# Patient Record
(demographics unavailable — no encounter records)

---

## 2024-11-08 NOTE — PHYSICAL EXAM
[Fully active, able to carry on all pre-disease performance without restriction] : Status 0 - Fully active, able to carry on all pre-disease performance without restriction [Normal] : affect appropriate [de-identified] : B mastectomy with implant reconstruction; ecchymosis over the anterior chest wall; no abnl LN palpable

## 2024-11-08 NOTE — HISTORY OF PRESENT ILLNESS
[Disease: _____________________] : Disease: [unfilled] [T: ___] : T[unfilled] [N: ___] : N[unfilled] [AJCC Stage: ____] : AJCC Stage: [unfilled] [de-identified] : Age 55: bilateral breast cancer Screen detected: she had interval breast mammogram/ sonogram which showed L breast findings with additional imaging showing 9:00: 2 cm FN multilobulated nodule versus 2 adjacent nodules: the anterior 0.5 x 0.4 x 0.5 cm and the posterior 1.1 x 0.6 cm which may be benign. She had left breast u/s guided biopsy 9:00 which showed invasive ductal carcinoma well differentiated with associated calcifications: Grade 1, ER %, [place] 71-80%, Her2 negative (1) and atypical ductal hyperplasia. She had MRI of the breast done on 1/3/2024 which showed right breast in the central inner breast clumped segmental non mass enhancement spanning 3.4 cm in AP diameter, upper outer breast middle depth 0.3 cm focus of enhancement with washout kinetics. Left breast showed biopsy proven carcinoma at 9:00 measuring 1.3 x 1.8 x 1.5 cm; non mass enhancement extends 0.9 cm anterolateral from the mass. Biopsy of the R breast upper outer quadrant of the MRI enhancement showed DCIS. She sought different surgical opinions. On 2/14/2024, she underwent bilateral mastectomy with SLNB with Dr Fowler at [facility]. She had concomitant expander reconstruction. The left breast pathology showed left breast invasive ductal carcinoma measuring 4 mm, Grade 1, and negative SLN (0/6): ER >90%, AL <1%, Her2 (2). The right breast pathology showed 2 foci of microinvasive disease </ = 1mm, DCIS measuring 3 cm, and negative SLN (0/5): Her2 was 3+: positive. She was started on anastrozole 3/2024. Stopped medication due to worsening stiffness and joint pain causing instability with walking. Trial of letrozole 7/2024: arthralgias were different: made fingers freeze and also affecting legs after 1 month. Trial of exemestane:  [de-identified] : anastrozole 3/2024 to 6/2024  letrozole 7/2024 to 9/2024 exemestane 11/2024 to present  [de-identified] : L: invasive ductal carcinoma well differentiated ER %, OK 71-80%, Her2 negative R microinvasive ductal carcinoma Her2 positive  [de-identified] : Since last evaluation, she feels the arthralgias have improved. She is taking the omega 3 supplement. Occasionally has trigger finger but much improved. She had 2nd reconstruction revision and has bruising. Denies any other health changes.

## 2024-11-08 NOTE — REVIEW OF SYSTEMS
[Diarrhea: Grade 0] : Diarrhea: Grade 0 [Joint Pain] : no joint pain [Joint Stiffness] : joint stiffness [Muscle Pain] : no muscle pain [Muscle Weakness] : no muscle weakness [Skin Rash] : no skin rash [Skin Wound] : no skin wound [Easy Bleeding] : no tendency for easy bleeding [Easy Bruising] : a tendency for easy bruising [Swollen Glands] : no swollen glands [Negative] : Allergic/Immunologic [de-identified] : bruising after her 2nd reconstruction

## 2024-11-08 NOTE — BEGINNING OF VISIT
[0] : 2) Feeling down, depressed, or hopeless: Not at all (0) [PHQ-2 Negative] : PHQ-2 Negative [Pain Scale: ___] : On a scale of 1-10, today the patient's pain is a(n) [unfilled]. [Future Reassessment of Pain Scale] : Future reassessment of pain scale [Never] : Never [Date Discussed (MM/DD/YY): ___] : Discussed: [unfilled] [With Patient/Caregiver] : with Patient/Caregiver

## 2024-11-08 NOTE — ASSESSMENT
[FreeTextEntry1] : She is a 56 y/o postmenopausal  F Stage I (T1N0) invasive ductal carcinoma that is ER %, [place] 71-80%, Her2 negative and microinvasive Stage I ([id number]) invasive ductal carcinoma Her2 positive. She is currently s/p B mastectomy with B reconstruction. She had trial of anastrozole but developed arthralgias and letrozole with freezing of the fingers and leg pain. She will try exemestane MWF x 4 weeks and we will see if better tolerated. If tolerated, will titrate to daily. We reviewed tamoxifen as other option if AI therapy not tolerated. Questions answered to her satisfaction. She is agreeable with plan. Next follow up in 4 months but earlier if any new symptoms.  Osteopenia: will obtain copy of recent bone density done with Dr Ruggiero. We reviewed calcium and Vitamin D supplementation along with exercise to maintain bone health. Will monitor on AI therapy.   HCP: her

## 2024-11-08 NOTE — ASSESSMENT
[FreeTextEntry1] : She is a 54 y/o postmenopausal  F Stage I (T1N0) invasive ductal carcinoma that is ER %, [place] 71-80%, Her2 negative and microinvasive Stage I ([id number]) invasive ductal carcinoma Her2 positive. She is currently s/p B mastectomy with B reconstruction. She had trial of anastrozole but developed arthralgias and letrozole with freezing of the fingers and leg pain. She will try exemestane MWF x 4 weeks and we will see if better tolerated. If tolerated, will titrate to daily. We reviewed tamoxifen as other option if AI therapy not tolerated. Questions answered to her satisfaction. She is agreeable with plan. Next follow up in 4 months but earlier if any new symptoms.  Osteopenia: will obtain copy of recent bone density done with Dr Ruggiero. We reviewed calcium and Vitamin D supplementation along with exercise to maintain bone health. Will monitor on AI therapy.   HCP: her

## 2024-11-08 NOTE — PHYSICAL EXAM
[Fully active, able to carry on all pre-disease performance without restriction] : Status 0 - Fully active, able to carry on all pre-disease performance without restriction [Normal] : affect appropriate [de-identified] : B mastectomy with implant reconstruction; ecchymosis over the anterior chest wall; no abnl LN palpable

## 2024-11-08 NOTE — REVIEW OF SYSTEMS
[Diarrhea: Grade 0] : Diarrhea: Grade 0 [Joint Pain] : no joint pain [Joint Stiffness] : joint stiffness [Muscle Pain] : no muscle pain [Muscle Weakness] : no muscle weakness [Skin Rash] : no skin rash [Skin Wound] : no skin wound [Easy Bleeding] : no tendency for easy bleeding [Easy Bruising] : a tendency for easy bruising [Swollen Glands] : no swollen glands [Negative] : Allergic/Immunologic [de-identified] : bruising after her 2nd reconstruction

## 2024-11-08 NOTE — HISTORY OF PRESENT ILLNESS
[Disease: _____________________] : Disease: [unfilled] [T: ___] : T[unfilled] [N: ___] : N[unfilled] [AJCC Stage: ____] : AJCC Stage: [unfilled] [de-identified] : Age 55: bilateral breast cancer Screen detected: she had interval breast mammogram/ sonogram which showed L breast findings with additional imaging showing 9:00: 2 cm FN multilobulated nodule versus 2 adjacent nodules: the anterior 0.5 x 0.4 x 0.5 cm and the posterior 1.1 x 0.6 cm which may be benign. She had left breast u/s guided biopsy 9:00 which showed invasive ductal carcinoma well differentiated with associated calcifications: Grade 1, ER %, OK 71-80%, Her2 negative (1) and atypical ductal hyperplasia. She had MRI of the breast done on 1/3/2024 which showed right breast in the central inner breast clumped segmental non mass enhancement spanning 3.4 cm in AP diameter, upper outer breast middle depth 0.3 cm focus of enhancement with washout kinetics. Left breast showed biopsy proven carcinoma at 9:00 measuring 1.3 x 1.8 x 1.5 cm; non mass enhancement extends 0.9 cm anterolateral from the mass. Biopsy of the R breast upper outer quadrant of the MRI enhancement showed DCIS. She sought different surgical opinions. On 2/14/2024, she underwent bilateral mastectomy with SLNB with Dr Fowler at [facility]. She had concomitant expander reconstruction. The left breast pathology showed left breast invasive ductal carcinoma measuring 4 mm, Grade 1, and negative SLN (0/6): ER >90%, OK <1%, Her2 (2). The right breast pathology showed 2 foci of microinvasive disease </ = 1mm, DCIS measuring 3 cm, and negative SLN (0/5): Her2 was 3+: positive. She was started on anastrozole 3/2024. Stopped medication due to worsening stiffness and joint pain causing instability with walking. Trial of letrozole 7/2024: arthralgias were different: made fingers freeze and also affecting legs after 1 month. Trial of exemestane:  [de-identified] : L: invasive ductal carcinoma well differentiated ER %, [place] 71-80%, Her2 negative R microinvasive ductal carcinoma Her2 positive  [de-identified] : anastrozole 3/2024 to 6/2024  letrozole 7/2024 to 9/2024 exemestane 11/2024 to present  [de-identified] : Since last evaluation, she feels the arthralgias have improved. She is taking the omega 3 supplement. Occasionally has trigger finger but much improved. She had 2nd reconstruction revision and has bruising. Denies any other health changes.

## 2024-11-08 NOTE — REASON FOR VISIT
[Follow-Up Visit] : a follow-up [Spouse] : spouse [FreeTextEntry2] : follow up for breast cancer off letrozole

## 2025-03-21 NOTE — PHYSICAL EXAM
[Fully active, able to carry on all pre-disease performance without restriction] : Status 0 - Fully active, able to carry on all pre-disease performance without restriction [Normal] : affect appropriate [de-identified] : B mastectomy with implant reconstruction; ecchymosis over the anterior chest wall; no abnl LN palpable; no pain on palpation over the L chest wall

## 2025-03-21 NOTE — CONSULT LETTER
[Dear  ___] : Dear  [unfilled], [Courtesy Letter:] : I had the pleasure of seeing your patient, [unfilled], in my office today. [Please see my note below.] : Please see my note below. [Consult Closing:] : Thank you very much for allowing me to participate in the care of this patient.  If you have any questions, please do not hesitate to contact me. [Sincerely,] : Sincerely, [FreeTextEntry2] : Johnny Fowler  [address] [place] [address] [FreeTextEntry3] : Luis Antonio MD Attending [facility]

## 2025-03-21 NOTE — HISTORY OF PRESENT ILLNESS
[Disease: _____________________] : Disease: [unfilled] [T: ___] : T[unfilled] [N: ___] : N[unfilled] [AJCC Stage: ____] : AJCC Stage: [unfilled] [de-identified] : Age 55: bilateral breast cancer Screen detected: she had interval breast mammogram/ sonogram which showed L breast findings with additional imaging showing 9:00: 2 cm FN multilobulated nodule versus 2 adjacent nodules: the anterior 0.5 x 0.4 x 0.5 cm and the posterior 1.1 x 0.6 cm which may be benign. She had left breast u/s guided biopsy 9:00 which showed invasive ductal carcinoma well differentiated with associated calcifications: Grade 1, ER %, [place] 71-80%, Her2 negative (1) and atypical ductal hyperplasia. She had MRI of the breast done on 1/3/2024 which showed right breast in the central inner breast clumped segmental non mass enhancement spanning 3.4 cm in AP diameter, upper outer breast middle depth 0.3 cm focus of enhancement with washout kinetics. Left breast showed biopsy proven carcinoma at 9:00 measuring 1.3 x 1.8 x 1.5 cm; non mass enhancement extends 0.9 cm anterolateral from the mass. Biopsy of the R breast upper outer quadrant of the MRI enhancement showed DCIS. She sought different surgical opinions. On 2/14/2024, she underwent bilateral mastectomy with SLNB with Dr Fowler at [facility]. She had concomitant expander reconstruction. The left breast pathology showed left breast invasive ductal carcinoma measuring 4 mm, Grade 1, and negative SLN (0/6): ER >90%, DC <1%, Her2 (2). The right breast pathology showed 2 foci of microinvasive disease </ = 1mm, DCIS measuring 3 cm, and negative SLN (0/5): Her2 was 3+: positive. She was started on anastrozole 3/2024. Stopped medication due to worsening stiffness and joint pain causing instability with walking. Trial of letrozole 7/2024: arthralgias were different: made fingers freeze and also affecting legs after 1 month. Trial of exemestane: 11/2024.  [de-identified] : L: invasive ductal carcinoma well differentiated ER %, [place] 71-80%, Her2 negative R microinvasive ductal carcinoma Her2 positive  [de-identified] : anastrozole 3/2024 to 6/2024  letrozole 7/2024 to 9/2024 exemestane 11/2024 to present  [de-identified] : Since last evaluation, she has been taking the exemestane 5 times a week. Feels joint stiffness is manageable and able to get up from squatting position. She has noticed gnawing sensation over the L chest wall. Had noticed with breast diagnosis where she was having pain and then had breast cancer diagnosis. She had not noticed any pain until 2/2025. No recent exertion or trauma. She went to cardiologist and had EKG along with CXR which did not show any changes. She feels pain is persistent. Denies any new b back pain, cough or HA.

## 2025-03-21 NOTE — ASSESSMENT
[FreeTextEntry1] : She is a 56 y/o postmenopausal  F Stage I (T1N0) invasive ductal carcinoma that is ER %, [place] 71-80%, Her2 negative and microinvasive Stage I ([id number]) invasive ductal carcinoma Her2 positive. She is currently s/p B mastectomy with B reconstruction. She had started on endocrine therapy 3/2024 and currently has been on exemestane since 11/2024. She has been tolerating 5 days a week and will increase to 6 days/ week. Reviewed supportive measures for joints. She has new L chest wall pain: no new findings on exam but given history of breast cancer, will obtain us of the chest wall and evaluate for new nodules. We reviewed if persistent pain and no findings on us: we may order MRi of the breast to further evaluate: she will need alprazolam to help tolerate MRi. Questions answered to her satisfaction. She is agreeable with plan. Next follow up in 4 months but earlier if any new symptoms.  Osteopenia: bone density done with Dr Ruggiero 3/2024. We reviewed calcium and Vitamin D supplementation along with exercise to maintain bone health. Will monitor on AI therapy.   HCP: her

## 2025-03-21 NOTE — REVIEW OF SYSTEMS
[Diarrhea: Grade 0] : Diarrhea: Grade 0 [Negative] : Allergic/Immunologic [Skin Rash] : no skin rash [Skin Wound] : no skin wound [de-identified] : gnawing sensation over the L chest wall